# Patient Record
Sex: MALE | Race: WHITE | Employment: OTHER | URBAN - NONMETROPOLITAN AREA
[De-identification: names, ages, dates, MRNs, and addresses within clinical notes are randomized per-mention and may not be internally consistent; named-entity substitution may affect disease eponyms.]

---

## 2017-06-26 ENCOUNTER — AMBULATORY - GICH (OUTPATIENT)
Dept: RADIOLOGY | Facility: OTHER | Age: 67
End: 2017-06-26

## 2017-06-26 DIAGNOSIS — R01.1 CARDIAC MURMUR: ICD-10-CM

## 2017-07-10 ENCOUNTER — MEDICAL CORRESPONDENCE (OUTPATIENT)
Facility: CLINIC | Age: 67
End: 2017-07-10
Payer: COMMERCIAL

## 2017-07-10 ENCOUNTER — HOSPITAL ENCOUNTER (OUTPATIENT)
Dept: RADIOLOGY | Facility: OTHER | Age: 67
End: 2017-07-10

## 2017-07-10 ENCOUNTER — TRANSFERRED RECORDS (OUTPATIENT)
Dept: HEALTH INFORMATION MANAGEMENT | Facility: CLINIC | Age: 67
End: 2017-07-10

## 2017-07-10 DIAGNOSIS — R01.1 CARDIAC MURMUR: ICD-10-CM

## 2017-07-10 PROCEDURE — 93306 TTE W/DOPPLER COMPLETE: CPT | Mod: 26 | Performed by: INTERNAL MEDICINE

## 2018-01-31 ENCOUNTER — DOCUMENTATION ONLY (OUTPATIENT)
Dept: FAMILY MEDICINE | Facility: OTHER | Age: 68
End: 2018-01-31

## 2018-05-22 ENCOUNTER — TRANSFERRED RECORDS (OUTPATIENT)
Dept: HEALTH INFORMATION MANAGEMENT | Facility: OTHER | Age: 68
End: 2018-05-22

## 2018-07-03 ENCOUNTER — TRANSFERRED RECORDS (OUTPATIENT)
Dept: HEALTH INFORMATION MANAGEMENT | Facility: OTHER | Age: 68
End: 2018-07-03

## 2018-07-13 ENCOUNTER — OFFICE VISIT (OUTPATIENT)
Dept: CARDIOLOGY | Facility: OTHER | Age: 68
End: 2018-07-13
Attending: INTERNAL MEDICINE
Payer: COMMERCIAL

## 2018-07-13 VITALS
HEIGHT: 71 IN | BODY MASS INDEX: 26.98 KG/M2 | DIASTOLIC BLOOD PRESSURE: 76 MMHG | WEIGHT: 192.7 LBS | HEART RATE: 60 BPM | SYSTOLIC BLOOD PRESSURE: 102 MMHG

## 2018-07-13 DIAGNOSIS — I10 ESSENTIAL HYPERTENSION: Primary | ICD-10-CM

## 2018-07-13 DIAGNOSIS — R00.2 PALPITATIONS: ICD-10-CM

## 2018-07-13 DIAGNOSIS — N52.9 ERECTILE DYSFUNCTION, UNSPECIFIED ERECTILE DYSFUNCTION TYPE: ICD-10-CM

## 2018-07-13 DIAGNOSIS — Z90.5 H/O UNILATERAL NEPHRECTOMY: ICD-10-CM

## 2018-07-13 DIAGNOSIS — R06.02 SHORTNESS OF BREATH: ICD-10-CM

## 2018-07-13 DIAGNOSIS — I38 VALVULAR REGURGITATION: ICD-10-CM

## 2018-07-13 PROCEDURE — G0463 HOSPITAL OUTPT CLINIC VISIT: HCPCS | Mod: 25

## 2018-07-13 PROCEDURE — 99204 OFFICE O/P NEW MOD 45 MIN: CPT | Performed by: INTERNAL MEDICINE

## 2018-07-13 PROCEDURE — 93005 ELECTROCARDIOGRAM TRACING: CPT | Performed by: INTERNAL MEDICINE

## 2018-07-13 ASSESSMENT — PAIN SCALES - GENERAL: PAINLEVEL: NO PAIN (0)

## 2018-07-13 NOTE — PROGRESS NOTES
[facility] HEART CARE   CARDIOLOGY CONSULT     Garcia Isbell   1950  [id number]    Ming Johnson     Chief Complaint   Patient presents with     Consult     Abnormal Echo          HPI:   Mr. Mandel is 67-year-old gentleman who is being seen for trace aortic regurgitation and mild tricuspid regurgitation.  Also, he has concerned with dyspnea with significant activity as well as erectile dysfunction.    He sees Dr. Johnson in [place], [place].  It sounds like he was in there for routine visit.  His echo was reviewed.  He was found to have trace aortic insufficiency and mild tricuspid regurgitation.  He wanted a second opinion more or less for reassurance that nothing bad is going to come about.  His ejection fraction of 60-65%.  He had no other significant findings.  His echo was completed on 10/7/17.    He also has concerns with ED.  He states that he has tried all 3 medications for ED but he is unable to maintain erection.  It seems that his problem with ED is more of a mindset than a vascular problem.  It was explained to him that this is not a common thing to be treated by cardiology.  He was hoping to be seen by urology and a referral was placed.    He also has concerns for dyspnea.  He is rather active.  He says he walks on a regular basis.  When they were self, he would ride his bike.  He went to [place] with his son and they were able to ride there bikes on paved trails.  He describes them as being hilly.  There were older women there who were able to do this without problems.  He thinks he should be able to do them as well.  He was concerned it was heart related.  He has been able to lose 20 pounds with his activity.     He is also concerned with fatigue.  He goes to bed at approximately 9 or 10:00 at night and sleeps till 5 AM.  He said 6 AM is sleeping in for him.  It does not sound like this is cardiac related.    IMAGING RESULTS:       ALLERGIES:   No Known Allergies     PAST MEDICAL HISTORY:   No past  medical history on file.     PAST SURGICAL HISTORY:   No past surgical history on file.     FAMILY HISTORY:   No family history on file.     SOCIAL HISTORY:   Social History     Social History     Marital status:      Spouse name: N/A     Number of children: N/A     Years of education: N/A     Social History Main Topics     Smoking status: Never Smoker     Smokeless tobacco: Never Used     Alcohol use None     Drug use: None     Sexual activity: Not Asked     Other Topics Concern     None     Social History Narrative    p 9/12/2013.         CURRENT MEDICATIONS:   Prior to Admission medications    Not on File          ROS:   CONSTITUTIONAL: No weight loss, fever, chills, but he does feel weak and have fatigue.   HEENT: Eyes: No visual changes. Ears, Nose, Throat: No hearing loss, congestion or difficulty swallowing.   CARDIOVASCULAR: No chest pain, chest pressure or chest discomfort. No palpitations or lower extremity edema.   RESPIRATORY: (+) shortness of breath with dyspnea upon exertion, but no cough or sputum production.   GASTROINTESTINAL: No abdominal pain. No anorexia, nausea, vomiting or diarrhea.   NEUROLOGICAL: No headache, lightheadedness, dizziness, syncope, ataxia or weakness.   HEMATOLOGIC: No anemia, bleeding or bruising.   PSYCHIATRIC: No history of depression or anxiety.   ENDOCRINOLOGIC: No reports of sweating, cold or heat intolerance. No polyuria or polydipsia.   SKIN: No abnormal rashes or itching.       PHYSICAL EXAM:   GENERAL: The patient is a well-developed, well-nourished, in no apparent distress. Alert and oriented x3.   HEENT: Head is normocephalic and atraumatic. Eyes are symmetrical with normal visual tracking.  HEART: Regular rate and rhythm, S1S2 present without murmur, rub or gallop.   LUNGS: Respirations regular and unlabored. Clear to auscultation.   GI: Abdomen is soft and non distended.   EXTREMITIES: No peripheral edema present.   MUSCULOSKELETAL: No joint swelling.    NEUROLOGIC: Alert and oriented X3.    SKIN: No jaundice. No rashes or visible skin lesions present.       EKG:    Shows normal sinus rhythm.    LAB RESULTS:   No visits with results within 2 Month(s) from this visit.  Latest known visit with results is:    Ambulatory - [facility] on 09/17/2012   Component Date Value Ref Range Status     TSH - Historical 09/17/2012 1.28  0.35 - 4.94 uIU/mL Final     T4 (Thyroxine) - Historical 09/17/2012 7.6  4.9 - 11.7 ug/dL Final     Glucose 09/17/2012 98  65 - 100 mg/dL Final     Urea Nitrogen 09/17/2012 23  8 - 25 mg/dL Final     A/G Ratio - Historical 09/17/2012 1.4  1.0 - 2.0 Final     Potassium 09/17/2012 4.8  3.5 - 5.0 mmol/L Final     BUN/Creatinine Ratio - Historical 09/17/2012 19  10 - 20 Final     Protein Total 09/17/2012 8.0  6.0 - 8.0 g/dL Final     Alkaline Phosphatase - Historical 09/17/2012 61  50 - 136 U/L Final     AST (GOT) - Historical 09/17/2012 27  2 - 40 U/L Final     GFR If Not  - Hist* 09/17/2012 >60  >60 ml/min/1.73m2 Final     Globulin - Historical 09/17/2012 3.3  2.0 - 3.7 g/dL Final     Bilirubin, Total - Historical 09/17/2012 1.0  0.2 - 1.2 mg/dL Final     Sodium 09/17/2012 140  135 - 145 mmol/L Final     Carbon Dioxide 09/17/2012 26  23 - 31 mmol/L Final     Albumin 09/17/2012 4.7  3.8 - 5.0 g/dL Final     Chloride 09/17/2012 106  98 - 107 mmol/L Final     ALT (SGPT) - Historical 09/17/2012 46* 8 - 45 U/L Final     Anion Gap - Historical 09/17/2012 8  5 - 18 Final     Calcium 09/17/2012 9.1  8.5 - 10.5 mg/dL Final     Creatinine 09/17/2012 1.19  0.72 - 1.25 mg/dL Final     GFR Estimate If Black 09/17/2012 >60  >60 ml/min/1.73m2 Final     HDL Cholesterol 09/17/2012 43  >40 mg/dL Final     Cholesterol/HDL Ratio - Historical 09/17/2012 3.44  <4.50 Final     LDL Cholesterol Calculated 09/17/2012 89  <=130 mg/dL Final     Patient Status - Historical 09/17/2012 FASTING   Final     Triglycerides 09/17/2012 78  <150 mg/dL Final     Cholesterol  Total 09/17/2012 148  100 - 199 mg/dL Final     PSA Total (Diagnostic) - Historical 09/17/2012 1.49  <4.00 ng/mL Final     Hemoglobin 09/17/2012 15.2  13.5 - 17.5 g/dL Final     MCHC 09/17/2012 35.3  32.0 - 36.0 g/dL Final     Absolute Monocytes - Historical 09/17/2012 0.6  <0.9 thou/cu mm Final     White Blood Count - Historical 09/17/2012 9.5  4.5 - 11.0 thou/cu mm Final     % Neutrophils 09/17/2012 60.9  42.0 - 72.0 % Final     MCH 09/17/2012 31.9  26.0 - 34.0 pg Final     RDW 09/17/2012 11.1* 11.5 - 15.5 % Final     Absolute Neutrophils - Historical 09/17/2012 5.8  1.7 - 7.0 thou/cu mm Final     BASO 09/17/2012 1.6  <3.0 % Final     Hematocrit 09/17/2012 42.9  37.0 - 53.0 % Final     Platelet Count 09/17/2012 279  140 - 440 thou/cu mm Final     % Monocytes 09/17/2012 6.1  <12.0 % Final     % Eosinophils 09/17/2012 1.0  <8.0 % Final     Absolute Lymphocytes - Historical 09/17/2012 2.9  0.9 - 2.9 thou/cu mm Final     Absolute Eosinophils - Historical 09/17/2012 0.1  <0.5 thou/cu mm Final     MPV 09/17/2012 8.4  6.5 - 11.0 fL Final     Red Blood Count - Historical 09/17/2012 4.75  4.30 - 5.90 mil/cu mm Final     MCV 09/17/2012 90  80 - 100 fL Final     % Lymphocytes 09/17/2012 30.4  20.0 - 44.0 % Final     Absolute Basophils - Historical 09/17/2012 0.1  <0.3 thou/cu mm Final            ASSESSMENT:       ICD-10-CM    1. Essential hypertension I10 EKG 12-lead, tracing only (Same Day)   2. Erectile dysfunction, unspecified erectile dysfunction type N52.9 UROLOGY ADULT REFERRAL   3. Palpitations R00.2    4. Shortness of breath R06.02    5. Valvular regurgitation I38          PLAN:   1.  We reviewed his echo.  He was reassured.  These valvular abnormalities are not concerning and do not need to be followed.  2.  As far as erectile dysfunction, he was referred to urology.  It was explained to him that ED can be a mindset rather than a vascular problem.  He wanted to see urology anyway.  3.  He was reassured about his  abilities with activity.  He is not describing chest pain or chest discomfort.  It seems that it takes considerable exertion to make him short of breath.  He rides his bike at least 7 miles.  When he is on flat ground, he has no problems.  It was suggested he focus on his activity, increasing it slowly as previously instructed.  4.  As far as fatigue, it was recommended to focus on sleep hygiene.  5.  He will be seen in the future as needed basis as he does not have any pressing cardiac concerns.      Thank you for allowing me to participate in the care of your patient. Please do not hesitate to contact me if you have any questions.     Bharat Rosales, DO

## 2018-07-13 NOTE — MR AVS SNAPSHOT
After Visit Summary   7/13/2018    Garcia Isbell    MRN: [id number]           Patient Information     Date Of Birth          1950        Visit Information        Provider Department      7/13/2018 1:15 PM Bharat Rosales, DO [facility]        Today's Diagnoses     Essential hypertension    -  1    Erectile dysfunction, unspecified erectile dysfunction type        Palpitations        Shortness of breath        Valvular regurgitation        H/O unilateral nephrectomy           Follow-ups after your visit        Additional Services     UROLOGY ADULT REFERRAL       Your provider has referred you to: [facility] for ED.  Please be aware that coverage of these services is subject to the terms and limitations of your health insurance plan.  Call member services at your health plan with any benefit or coverage questions.      Please bring the following with you to your appointment:    (1) Any X-Rays, CTs or MRIs which have been performed.  Contact the facility where they were done to arrange for  prior to your scheduled appointment.    (2) List of current medications  (3) This referral request   (4) Any documents/labs given to you for this referral                  Follow-up notes from your care team     Return if symptoms worsen or fail to improve.      Who to contact     If you have questions or need follow up information about today's clinic visit or your schedule please contact [facility] AND [facility] directly at [phone].  Normal or non-critical lab and imaging results will be communicated to you by MyChart, letter or phone within 4 business days after the clinic has received the results. If you do not hear from us within 7 days, please contact the clinic through [employer] or phone. If you have a critical or abnormal lab result, we will notify you by phone as soon as possible.  Submit refill requests through [employer] or call your pharmacy and they will forward the  "refill request to us. Please allow 3 business days for your refill to be completed.          Additional Information About Your Visit        MyChart Information     [employer] lets you send messages to your doctor, view your test results, renew your prescriptions, schedule appointments and more. To sign up, go to [email or web address] . Click on \"Log in\" on the left side of the screen, which will take you to the Welcome page. Then click on \"Sign up Now\" on the right side of the page.     You will be asked to enter the access code listed below, as well as some personal information. Please follow the directions to create your username and password.     Your access code is: [email or web address]  Expires: 10/11/2018  2:32 PM     Your access code will  in 90 days. If you need help or a new code, please call your [place] clinic or [phone].        Care EveryWhere ID     This is your Care EveryWhere ID. This could be used by other organizations to access your [place] medical records  [id number]        Your Vitals Were     Pulse Height BMI (Body Mass Index)             60 1.803 m (5' 11\") 26.88 kg/m2          Blood Pressure from Last 3 Encounters:   18 102/76    Weight from Last 3 Encounters:   18 87.4 kg (192 lb 11.2 oz)              We Performed the Following     EKG 12-lead, tracing only (Same Day)     UROLOGY ADULT REFERRAL        Primary Care Provider Office Phone # Fax #    Ming Johnson [phone]       DAISHA   [facility] [address]        Equal Access to Services     [facility]: Hadii brian hicks hadasho Sobella, waaxda luqadaha, qaybta kaalmada adrien, leonard parisi . So [facility] [phone].    ATENCIÓN: Si habla español, tiene a yancey disposición servicios gratuitos de asistencia lingüística. Llame al [phone].    We comply with applicable federal civil rights laws and [place] laws. We do not discriminate on the basis of race, color, " national origin, age, disability, sex, sexual orientation, or gender identity.            Thank you!     Thank you for choosing [facility] AND [facility]  for your care. Our goal is always to provide you with excellent care. Hearing back from our patients is one way we can continue to improve our services. Please take a few minutes to complete the written survey that you may receive in the mail after your visit with us. Thank you!             Your Updated Medication List - Protect others around you: Learn how to safely use, store and throw away your medicines at www.disposemymeds.org.      Notice  As of 7/13/2018  2:32 PM    You have not been prescribed any medications.

## 2018-07-13 NOTE — NURSING NOTE
Pt presents today for consultation r/t Echo 07/2017. Pt PCP Dr. Johnson at [facility] in [place]. C/o fatigue and episodes of lightheadedness on position change. Denies SOB, chest pain/pressure/heaviness. Vashti Castro RN on 7/13/2018 at 1:25 PM

## 2018-07-26 ENCOUNTER — OFFICE VISIT (OUTPATIENT)
Dept: UROLOGY | Facility: OTHER | Age: 68
End: 2018-07-26
Attending: UROLOGY
Payer: COMMERCIAL

## 2018-07-26 VITALS — RESPIRATION RATE: 14 BRPM | HEART RATE: 60 BPM | WEIGHT: 192 LBS | BODY MASS INDEX: 26.78 KG/M2

## 2018-07-26 DIAGNOSIS — N52.9 ERECTILE DYSFUNCTION, UNSPECIFIED ERECTILE DYSFUNCTION TYPE: Primary | ICD-10-CM

## 2018-07-26 PROCEDURE — 99203 OFFICE O/P NEW LOW 30 MIN: CPT | Performed by: UROLOGY

## 2018-07-26 PROCEDURE — G0463 HOSPITAL OUTPT CLINIC VISIT: HCPCS

## 2018-07-26 RX ORDER — HYDROCHLOROTHIAZIDE 12.5 MG/1
CAPSULE ORAL
Refills: 0 | COMMUNITY
Start: 2018-05-31 | End: 2018-12-05

## 2018-07-26 RX ORDER — SILDENAFIL 100 MG/1
TABLET, FILM COATED ORAL
Qty: 40 TABLET | Refills: 11 | Status: SHIPPED | OUTPATIENT
Start: 2018-07-26

## 2018-07-26 ASSESSMENT — PAIN SCALES - GENERAL: PAINLEVEL: NO PAIN (0)

## 2018-07-26 NOTE — PROGRESS NOTES
I was asked to see this patient by Dr Rosales and provide my opinion about the following:  Erectile dysfunction    Type of Visit  Consult    Chief Complaint  Erectile dysfunction    HPI  Mr. Isbell is a 67 year old male who presents with erectile dysfunction.  His ED issues started about 10 years ago.  He has tried Viagra, Cialis and Levitra in the past.  Currently he is using sildenafil 100mg per dose.  He rates his erectile rigidity as 8/10 with treatment.  Primary issue is maintenance of erection.  He reports taking the previous PDE5 inhibitors correctly  He does not take oral nitrates for chest pain.  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      Past Medical History  Patient Active Problem List   Diagnosis     Essential hypertension     Erectile dysfunction, unspecified erectile dysfunction type     Palpitations     Shortness of breath     Valvular regurgitation     H/O unilateral nephrectomy     Past Surgical History  He  has no past surgical history on file.    Medications  He has a current medication list which includes the following prescription(s): hydrochlorothiazide.    Allergies  No Known Allergies    Social History  He  reports that he has never smoked. He has never used smokeless tobacco.  No drug abuse.    Family History  History reviewed. No pertinent family history.    Review of Systems  I personally reviewed the ROS with the patient.    Nursing Notes:   Keisha Willson LPN  7/26/2018  1:18 PM  Signed  Pt presents to clinic for discussion of ED problems    Review of Systems:    Weight loss:    Yes     Recent fever/chills:  No   Night sweats:   No  Current skin rash:  No   Recent hair loss:  No  Heat intolerance:  No   Cold intolerance:  No  Chest pain:   No   Palpitations:   No  Shortness of breath:  No   Wheezing:   No  Constipation:    No   Diarrhea:   No   Nausea:   No   Vomiting:   No   Kidney/side pain:  No   Back pain:   Yes  Frequent headaches:  No   Dizziness:     No  Leg  swelling:   No   Calf pain:    No    Parents, brothers or sisters with history of kidney cancer:   No  Parents, brothers or sisters with history of bladder cancer: No  Father or brother with history of prostate cancer:  No      Physical Exam  Vitals:    07/26/18 1310   Pulse: 60   Resp: 14   Weight: 87.1 kg (192 lb)     Constitutional: No acute distress.  Alert and cooperative   Head: NCAT  Eyes: Conjunctivae normal  Cardiovascular: Regular rate  Pulmonary/Chest: Respirations are even and non-labored bilaterally, no audible wheezing  Abdominal: Soft. No distension, tenderness, masses or guarding.   Neurological: A + O x 3.  Cranial Nerves II-XII grossly intact.  Extremities: SHELLIE x 4, Warm. No clubbing.  No cyanosis.    Skin: Pink, warm and dry.  No visible rashes noted.  Psychiatric:  Normal mood and affect  Back:  No left CVA tenderness.  No right CVA tenderness.  Genitourinary:  Nonpalpable bladder  Normal male phallus without discharge or lesions, normal pubic hair distribution.    Testicles descended bilaterally.     Labs  Results for TIFFANY WHITE (MRN [id number]) as of 7/26/2018 13:18   9/17/2012 11:20   PSA Total (Diagnostic) - Historical 1.49     Results for TIFFANY WHITE (MRN [id number]) as of 7/26/2018 13:18   9/17/2012 11:20   TSH - Historical 1.28     Results for TIFFANY WHITE (MRN [id number]) as of 7/26/2018 13:18   9/17/2012 11:02   Sodium 140   Potassium 4.8   Chloride 106   Carbon Dioxide 26   Urea Nitrogen 23   Creatinine 1.19   BUN/Creatinine Ratio - Historical 19   GFR Estimate If Black >60   GFR If Not  - Historical >60   Calcium 9.1   Anion Gap - Historical 8   A/G Ratio - Historical 1.4   Albumin 4.7   Protein Total 8.0   Alkaline Phosphatase - Historical 61   ALT (SGPT) - Historical 46 (H)   AST (GOT) - Historical 27     Assessment  Mr. White is a 67 year old male with erectile dysfunction.  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    We discussed treatment  options including oral PDE5- medication, Trimix injections, vacuum erection devices and implantable penile prostheses.    He was informed of the most common side effects of Viagra such as headache, flushing, nausea and visual changes (such as blurred vision).    Plan  Continue sildenafil 60-100mg by mouth 1 hour prior to sexual activity (empty stomach).   - To improve maintenance of erection I recommended a constriction band.  Follow up if ED progresses and requires additional therapy..

## 2018-07-26 NOTE — MR AVS SNAPSHOT
"              After Visit Summary   2018    Garcia Isbell    MRN: [id number]           Patient Information     Date Of Birth          1950        Visit Information        Provider Department      2018 1:00 PM Gene Hightower MD [facility]        Today's Diagnoses     Erectile dysfunction, unspecified erectile dysfunction type    -  1       Follow-ups after your visit        Who to contact     If you have questions or need follow up information about today's clinic visit or your schedule please contact [facility] directly at [phone].  Normal or non-critical lab and imaging results will be communicated to you by [email or web address], letter or phone within 4 business days after the clinic has received the results. If you do not hear from us within 7 days, please contact the clinic through [employer] or phone. If you have a critical or abnormal lab result, we will notify you by phone as soon as possible.  Submit refill requests through [employer] or call your pharmacy and they will forward the refill request to us. Please allow 3 business days for your refill to be completed.          Additional Information About Your Visit        MyChart Information     [employer] lets you send messages to your doctor, view your test results, renew your prescriptions, schedule appointments and more. To sign up, go to [email or web address] . Click on \"Log in\" on the left side of the screen, which will take you to the Welcome page. Then click on \"Sign up Now\" on the right side of the page.     You will be asked to enter the access code listed below, as well as some personal information. Please follow the directions to create your username and password.     Your access code is: [email or web address]  Expires: 10/11/2018  2:32 PM     Your access code will  in 90 days. If you need help or a new code, please call your [place] clinic or [phone].        Care EveryWhere ID     This is your Care " EveryWhere ID. This could be used by other organizations to access your [place] medical records  [id number]        Your Vitals Were     Pulse Respirations BMI (Body Mass Index)             60 14 26.78 kg/m2          Blood Pressure from Last 3 Encounters:   07/13/18 102/76    Weight from Last 3 Encounters:   07/26/18 87.1 kg (192 lb)   07/13/18 87.4 kg (192 lb 11.2 oz)              Today, you had the following     No orders found for display         Today's Medication Changes          These changes are accurate as of 7/26/18 11:59 PM.  If you have any questions, ask your nurse or doctor.               Start taking these medicines.        Dose/Directions    sildenafil 100 MG tablet   Commonly known as:  VIAGRA   Used for:  Erectile dysfunction, unspecified erectile dysfunction type   Started by:  Gene Hightower MD        Take 1 tablet 1 hour prior to sexual activity as instructed.   Quantity:  40 tablet   Refills:  11            Where to get your medicines      Some of these will need a paper prescription and others can be bought over the counter.  Ask your nurse if you have questions.     Bring a paper prescription for each of these medications     sildenafil 100 MG tablet                Primary Care Provider Office Phone # Fax #    Ming Johnson [phone]       [facility]   [facility] [phone]        Equal Access to Services     ROSALBA FARLEY AH: Massimo tovaro Sodemarcusali, waaxda luqadaha, qaybta kaalmada adeegyada, leonard maki. So [facility] [phone].    ATENCIÓN: Si habla español, tiene a yancey disposición servicios gratuitos de asistencia lingüística. Llame al [phone].    We comply with applicable federal civil rights laws and [place] laws. We do not discriminate on the basis of race, color, national origin, age, disability, sex, sexual orientation, or gender identity.            Thank you!     Thank you for choosing [facility] AND  [facility]  for your care. Our goal is always to provide you with excellent care. Hearing back from our patients is one way we can continue to improve our services. Please take a few minutes to complete the written survey that you may receive in the mail after your visit with us. Thank you!             Your Updated Medication List - Protect others around you: Learn how to safely use, store and throw away your medicines at www.disposemymeds.org.          This list is accurate as of 7/26/18 11:59 PM.  Always use your most recent med list.                   Brand Name Dispense Instructions for use Diagnosis    hydrochlorothiazide 12.5 MG capsule    MICROZIDE          sildenafil 100 MG tablet    VIAGRA    40 tablet    Take 1 tablet 1 hour prior to sexual activity as instructed.    Erectile dysfunction, unspecified erectile dysfunction type

## 2018-07-26 NOTE — NURSING NOTE
Pt presents to clinic for discussion of ED problems    Review of Systems:    Weight loss:    Yes     Recent fever/chills:  No   Night sweats:   No  Current skin rash:  No   Recent hair loss:  No  Heat intolerance:  No   Cold intolerance:  No  Chest pain:   No   Palpitations:   No  Shortness of breath:  No   Wheezing:   No  Constipation:    No   Diarrhea:   No   Nausea:   No   Vomiting:   No   Kidney/side pain:  No   Back pain:   Yes  Frequent headaches:  No   Dizziness:     No  Leg swelling:   No   Calf pain:    No    Parents, brothers or sisters with history of kidney cancer:   No  Parents, brothers or sisters with history of bladder cancer: No  Father or brother with history of prostate cancer:  No

## 2018-10-22 ENCOUNTER — ALLIED HEALTH/NURSE VISIT (OUTPATIENT)
Dept: FAMILY MEDICINE | Facility: OTHER | Age: 68
End: 2018-10-22
Payer: COMMERCIAL

## 2018-10-22 DIAGNOSIS — Z23 NEED FOR VACCINATION: Primary | ICD-10-CM

## 2018-10-22 DIAGNOSIS — Z23 NEED FOR PROPHYLACTIC VACCINATION AND INOCULATION AGAINST INFLUENZA: ICD-10-CM

## 2018-10-22 PROCEDURE — G0009 ADMIN PNEUMOCOCCAL VACCINE: HCPCS

## 2018-10-22 PROCEDURE — 90662 IIV NO PRSV INCREASED AG IM: CPT

## 2018-10-22 PROCEDURE — 90471 IMMUNIZATION ADMIN: CPT

## 2018-10-22 PROCEDURE — 90670 PCV13 VACCINE IM: CPT

## 2018-10-22 PROCEDURE — G0008 ADMIN INFLUENZA VIRUS VAC: HCPCS

## 2018-10-22 NOTE — MR AVS SNAPSHOT
After Visit Summary   10/22/2018    Garcia Isbell    MRN: [id number]           Patient Information     Date Of Birth          1950        Visit Information        Provider Department      10/22/2018 1:45 PM  INJECTION NURSE [facility]        Today's Diagnoses     Need for vaccination    -  1    Need for prophylactic vaccination and inoculation against influenza           Follow-ups after your visit        Your next 10 appointments already scheduled     Dec 19, 2018 10:15 AM CST   Nurse Only with  INJECTION NURSE   [facility]    [address]  [place] [id number] [phone]              Who to contact     If you have questions or need follow up information about today's clinic visit or your schedule please contact [facility] directly at [phone].  Normal or non-critical lab and imaging results will be communicated to you by MyChart, letter or phone within 4 business days after the clinic has received the results. If you do not hear from us within 7 days, please contact the clinic through MyChart or phone. If you have a critical or abnormal lab result, we will notify you by phone as soon as possible.  Submit refill requests through [employer] or call your pharmacy and they will forward the refill request to us. Please allow 3 business days for your refill to be completed.          Additional Information About Your Visit        Care EveryWhere ID     This is your Care EveryWhere ID. This could be used by other organizations to access your [facility] medical records  [id number]         Blood Pressure from Last 3 Encounters:   07/13/18 102/76    Weight from Last 3 Encounters:   07/26/18 192 lb (87.1 kg)   07/13/18 192 lb 11.2 oz (87.4 kg)              We Performed the Following     HC FLU VACCINE, INCREASED ANTIGEN, PRESV FREE     Pneumococcal vaccine 13 valent PCV13 IM (Prevnar)  [49252]     Vaccine Administration, Initial [[id number]]        Primary Care Provider Office Phone # Fax #    Ming Elizabeth [phone]       ADISHA   [facility] [address]        Equal Access to Services     ROSALBA FARLEY : Hadtwila brian hicks guyo Soomaali, waaxda luqadaha, qaybta kaalmada adeegyada, leonard keyesn antwan everett lagitadoron maki. So [facility] [phone].    ATENCIÓN: Si habla español, tiene a yancey disposición servicios gratuitos de asistencia lingüística. Llame al [phone].    We comply with applicable federal civil rights laws and [place] laws. We do not discriminate on the basis of race, color, national origin, age, disability, sex, sexual orientation, or gender identity.            Thank you!     Thank you for choosing [facility] [place] AND [facility]  for your care. Our goal is always to provide you with excellent care. Hearing back from our patients is one way we can continue to improve our services. Please take a few minutes to complete the written survey that you may receive in the mail after your visit with us. Thank you!             Your Updated Medication List - Protect others around you: Learn how to safely use, store and throw away your medicines at www.disposemymeds.org.          This list is accurate as of 10/22/18  1:57 PM.  Always use your most recent med list.                   Brand Name Dispense Instructions for use Diagnosis    hydrochlorothiazide 12.5 MG capsule    MICROZIDE          sildenafil 100 MG tablet    VIAGRA    40 tablet    Take 1 tablet 1 hour prior to sexual activity as instructed.    Erectile dysfunction, unspecified erectile dysfunction type

## 2018-10-22 NOTE — PROGRESS NOTES
Pt denies allergies to yeast gelatin neosporin eggs thimerasol or latex or past reactions to vaccinations. Copy of MIIC given.      Injectable Influenza Immunization Documentation    1.  Is the person to be vaccinated sick today?   No    2. Does the person to be vaccinated have an allergy to a component   of the vaccine?   No  Egg Allergy Algorithm Link    3. Has the person to be vaccinated ever had a serious reaction   to influenza vaccine in the past?   No    4. Has the person to be vaccinated ever had Guillain-Barré syndrome?   No    Form completed by John BEY

## 2018-12-05 ENCOUNTER — OFFICE VISIT (OUTPATIENT)
Dept: FAMILY MEDICINE | Facility: OTHER | Age: 68
End: 2018-12-05
Attending: FAMILY MEDICINE
Payer: COMMERCIAL

## 2018-12-05 VITALS
DIASTOLIC BLOOD PRESSURE: 78 MMHG | WEIGHT: 191.4 LBS | HEART RATE: 64 BPM | BODY MASS INDEX: 26.69 KG/M2 | SYSTOLIC BLOOD PRESSURE: 124 MMHG

## 2018-12-05 DIAGNOSIS — R10.84 ABDOMINAL PAIN, GENERALIZED: ICD-10-CM

## 2018-12-05 LAB
ALBUMIN SERPL-MCNC: 4.4 G/DL (ref 3.5–5.7)
ALP SERPL-CCNC: 55 U/L (ref 34–104)
ALT SERPL W P-5'-P-CCNC: 20 U/L (ref 7–52)
ANION GAP SERPL CALCULATED.3IONS-SCNC: 7 MMOL/L (ref 3–14)
AST SERPL W P-5'-P-CCNC: 17 U/L (ref 13–39)
BILIRUB SERPL-MCNC: 0.7 MG/DL (ref 0.3–1)
BUN SERPL-MCNC: 19 MG/DL (ref 7–25)
CALCIUM SERPL-MCNC: 9.3 MG/DL (ref 8.6–10.3)
CHLORIDE SERPL-SCNC: 104 MMOL/L (ref 98–107)
CO2 SERPL-SCNC: 29 MMOL/L (ref 21–31)
CREAT SERPL-MCNC: 1.09 MG/DL (ref 0.7–1.3)
ERYTHROCYTE [DISTWIDTH] IN BLOOD BY AUTOMATED COUNT: 12.2 % (ref 10–15)
GFR SERPL CREATININE-BSD FRML MDRD: NORMAL ML/MIN/1.7M2
GLUCOSE SERPL-MCNC: 91 MG/DL (ref 70–105)
HCT VFR BLD AUTO: 43.4 % (ref 40–53)
HGB BLD-MCNC: 15.1 G/DL (ref 13.3–17.7)
LIPASE SERPL-CCNC: 33 U/L (ref 11–82)
MCH RBC QN AUTO: 33.2 PG (ref 26.5–33)
MCHC RBC AUTO-ENTMCNC: 34.8 G/DL (ref 31.5–36.5)
MCV RBC AUTO: 95 FL (ref 78–100)
PLATELET # BLD AUTO: 252 10E9/L (ref 150–450)
POTASSIUM SERPL-SCNC: 4 MMOL/L (ref 3.5–5.1)
PROT SERPL-MCNC: 6.8 G/DL (ref 6.4–8.9)
RBC # BLD AUTO: 4.55 10E12/L (ref 4.4–5.9)
SODIUM SERPL-SCNC: 140 MMOL/L (ref 134–144)
WBC # BLD AUTO: 9.6 10E9/L (ref 4–11)

## 2018-12-05 PROCEDURE — G0463 HOSPITAL OUTPT CLINIC VISIT: HCPCS

## 2018-12-05 PROCEDURE — 36415 COLL VENOUS BLD VENIPUNCTURE: CPT | Performed by: FAMILY MEDICINE

## 2018-12-05 PROCEDURE — 85027 COMPLETE CBC AUTOMATED: CPT | Performed by: FAMILY MEDICINE

## 2018-12-05 PROCEDURE — 83690 ASSAY OF LIPASE: CPT | Performed by: FAMILY MEDICINE

## 2018-12-05 PROCEDURE — 80053 COMPREHEN METABOLIC PANEL: CPT | Performed by: FAMILY MEDICINE

## 2018-12-05 PROCEDURE — 99214 OFFICE O/P EST MOD 30 MIN: CPT | Performed by: FAMILY MEDICINE

## 2018-12-05 ASSESSMENT — PAIN SCALES - GENERAL: PAINLEVEL: NO PAIN (0)

## 2018-12-05 NOTE — MR AVS SNAPSHOT
After Visit Summary   12/5/2018    Garcia Isbell    MRN: [id number]           Patient Information     Date Of Birth          1950        Visit Information        Provider Department      12/5/2018 1:45 PM Ming Randall MD [facility]        Today's Diagnoses     Abdominal pain, generalized           Follow-ups after your visit        Your next 10 appointments already scheduled     Dec 19, 2018 10:15 AM CST   Nurse Only with [facility] INJECTION NURSE   [facility]    [address]  [place] [address] [phone]              Who to contact     If you have questions or need follow up information about today's clinic visit or your schedule please contact [facility] directly at [phone].  Normal or non-critical lab and imaging results will be communicated to you by MyChart, letter or phone within 4 business days after the clinic has received the results. If you do not hear from us within 7 days, please contact the clinic through MyChart or phone. If you have a critical or abnormal lab result, we will notify you by phone as soon as possible.  Submit refill requests through [employer] or call your pharmacy and they will forward the refill request to us. Please allow 3 business days for your refill to be completed.          Additional Information About Your Visit        Care EveryWhere ID     This is your Care EveryWhere ID. This could be used by other organizations to access your [facility] medical records  [id number]        Your Vitals Were     Pulse BMI (Body Mass Index)                64 26.69 kg/m2           Blood Pressure from Last 3 Encounters:   12/05/18 124/78   07/13/18 102/76    Weight from Last 3 Encounters:   12/05/18 191 lb 6.4 oz (86.8 kg)   07/26/18 192 lb (87.1 kg)   07/13/18 192 lb 11.2 oz (87.4 kg)              We Performed the Following     CBC W PLT No Diff      Comprehensive Metabolic Panel     Lipase          Today's Medication Changes          These changes are accurate as of 12/5/18 11:59 PM.  If you have any questions, ask your nurse or doctor.               Stop taking these medicines if you haven't already. Please contact your care team if you have questions.     hydrochlorothiazide 12.5 MG capsule   Commonly known as:  MICROZIDE   Stopped by:  Ming Randall MD                    Primary Care Provider Office Phone # Fax #    Ming Johnson [phone]       DAISHA   [facility] [address]        Equal Access to Services     [facility]: Hadii aad ku hadasho Soomaali, waaxda luqadaha, qaybta kaalmada adeegyada, leonard parisi . So [facility] [place] [phone].    ATENCIÓN: Si habla español, tiene a yancey disposición servicios gratuitos de asistencia lingüística. [facility] [phone].    We comply with applicable federal civil rights laws and [place] laws. We do not discriminate on the basis of race, color, national origin, age, disability, sex, sexual orientation, or gender identity.            Thank you!     Thank you for choosing [facility] [place] [facility] AND [facility]  for your care. Our goal is always to provide you with excellent care. Hearing back from our patients is one way we can continue to improve our services. Please take a few minutes to complete the written survey that you may receive in the mail after your visit with us. Thank you!             Your Updated Medication List - Protect others around you: Learn how to safely use, store and throw away your medicines at www.disposemymeds.org.          This list is accurate as of 12/5/18 11:59 PM.  Always use your most recent med list.                   Brand Name Dispense Instructions for use Diagnosis    sildenafil 100 MG tablet    VIAGRA    40 tablet    Take 1 tablet 1 hour prior to sexual activity as instructed.    Erectile dysfunction, unspecified erectile  dysfunction type

## 2018-12-05 NOTE — LETTER
December 6, 2018      Garcia Isbell  [address] [facility] [address]        Dear ,    As you can see your labs did come back normal.  Given some thought with your current situation I suspect that may be it might be your homeopathic medications including flaxseed Hilario seed etc.  I would recommend stopping all the medications and see if the symptoms resolve.  If not I would recommend following up.    {results letter list:149987}    Resulted Orders   Lipase   Result Value Ref Range    Lipase 33 11 - 82 U/L   Comprehensive Metabolic Panel   Result Value Ref Range    Sodium 140 134 - 144 mmol/L    Potassium 4.0 3.5 - 5.1 mmol/L    Chloride 104 98 - 107 mmol/L    Carbon Dioxide 29 21 - 31 mmol/L    Anion Gap 7 3 - 14 mmol/L    Glucose 91 70 - 105 mg/dL    Urea Nitrogen 19 7 - 25 mg/dL    Creatinine 1.09 0.70 - 1.30 mg/dL    GFR Estimate Not Calculated >60 mL/min/1.7m2    GFR Estimate If Black Not Calculated >60 mL/min/1.7m2    Calcium 9.3 8.6 - 10.3 mg/dL    Bilirubin Total 0.7 0.3 - 1.0 mg/dL    Albumin 4.4 3.5 - 5.7 g/dL    Protein Total 6.8 6.4 - 8.9 g/dL    Alkaline Phosphatase 55 34 - 104 U/L    ALT 20 7 - 52 U/L    AST 17 13 - 39 U/L   CBC W PLT No Diff   Result Value Ref Range    WBC 9.6 4.0 - 11.0 10e9/L    RBC Count 4.55 4.4 - 5.9 10e12/L    Hemoglobin 15.1 13.3 - 17.7 g/dL    Hematocrit 43.4 40.0 - 53.0 %    MCV 95 78 - 100 fl    MCH 33.2 (H) 26.5 - 33.0 pg    MCHC 34.8 31.5 - 36.5 g/dL    RDW 12.2 10.0 - 15.0 %    Platelet Count 252 150 - 450 10e9/L       If you have any questions or concerns, please call the clinic at the number listed above.       Sincerely,        Ming Randall MD

## 2018-12-05 NOTE — LETTER
December 6, 2018      Garcia Isbell  [address]  [place] [address]        Dear ,    We are writing to inform you of your test results.    Your test results fall within the expected range(s) or remain unchanged from previous results.  Please continue with current treatment plan.    Resulted Orders   Lipase   Result Value Ref Range    Lipase 33 11 - 82 U/L   Comprehensive Metabolic Panel   Result Value Ref Range    Sodium 140 134 - 144 mmol/L    Potassium 4.0 3.5 - 5.1 mmol/L    Chloride 104 98 - 107 mmol/L    Carbon Dioxide 29 21 - 31 mmol/L    Anion Gap 7 3 - 14 mmol/L    Glucose 91 70 - 105 mg/dL    Urea Nitrogen 19 7 - 25 mg/dL    Creatinine 1.09 0.70 - 1.30 mg/dL    GFR Estimate Not Calculated >60 mL/min/1.7m2    GFR Estimate If Black Not Calculated >60 mL/min/1.7m2    Calcium 9.3 8.6 - 10.3 mg/dL    Bilirubin Total 0.7 0.3 - 1.0 mg/dL    Albumin 4.4 3.5 - 5.7 g/dL    Protein Total 6.8 6.4 - 8.9 g/dL    Alkaline Phosphatase 55 34 - 104 U/L    ALT 20 7 - 52 U/L    AST 17 13 - 39 U/L   CBC W PLT No Diff   Result Value Ref Range    WBC 9.6 4.0 - 11.0 10e9/L    RBC Count 4.55 4.4 - 5.9 10e12/L    Hemoglobin 15.1 13.3 - 17.7 g/dL    Hematocrit 43.4 40.0 - 53.0 %    MCV 95 78 - 100 fl    MCH 33.2 (H) 26.5 - 33.0 pg    MCHC 34.8 31.5 - 36.5 g/dL    RDW 12.2 10.0 - 15.0 %    Platelet Count 252 150 - 450 10e9/L       If you have any questions or concerns, please call the clinic at the number listed above.       Sincerely,        Ming Randall MD

## 2018-12-05 NOTE — NURSING NOTE
Patient here for GI bloating and soft stools. This has been happening for the past 5 years. Last colonoscopy possible 4-6 years. He is having odorous flatuance and noisy as well.  He had polyps removed with both procedures. Medication Reconciliation: complete.    Beth Berkowitz LPN  12/5/2018 1:35 PM

## 2018-12-06 NOTE — PROGRESS NOTES
SUBJECTIVE:   Garcia Isbell is a 68 year old male who presents to clinic today for the following health issues: Loose stools    HPI Comments: Patient arrives here for loose stools.  His P CP is Dr. Diaz who it sounds like is out on medical leave according the patientch.  He comes in with bloating loose stools.  They have been going on for years.  He reports no discomfort.  States his abdomen is constantly making noise.  He feels bloated.  No antibiotics over the last 2 years.  He has had 2 colonoscopies in the last 5 years.  Both with polyps.  He believes his last colonoscopy was 2 years ago.  His weight is remained stable.  He is on numerous home medications including flaxseed Hilario B root vitamin D vitamin E among others.  He lost his kidney in 2006.  Foods do not seem to make it worse that he can associate them with.        Patient Active Problem List    Diagnosis Date Noted     Abdominal pain, generalized 12/05/2018     Priority: Medium     Essential hypertension 07/13/2018     Priority: Medium     Erectile dysfunction, unspecified erectile dysfunction type 07/13/2018     Priority: Medium     Palpitations 07/13/2018     Priority: Medium     Shortness of breath 07/13/2018     Priority: Medium     Valvular regurgitation 07/13/2018     Priority: Medium     H/O unilateral nephrectomy 07/13/2018     Priority: Medium     No past medical history on file.   No past surgical history on file.  Social History     Social History Narrative    p 9/12/2013.     Current Outpatient Prescriptions   Medication Sig Dispense Refill     sildenafil (VIAGRA) 100 MG tablet Take 1 tablet 1 hour prior to sexual activity as instructed. 40 tablet 11     No Known Allergies    Review of Systems     OBJECTIVE:     /78 (BP Location: Right arm, Patient Position: Sitting)  Pulse 64  Wt 191 lb 6.4 oz (86.8 kg)  BMI 26.69 kg/m2  Body mass index is 26.69 kg/(m^2).  Physical Exam   Constitutional: He appears well-developed.   HENT:   Head:  Normocephalic.   Left Ear: External ear normal.   Cardiovascular: Normal rate, regular rhythm and normal heart sounds.    No murmur heard.  Pulmonary/Chest: Effort normal and breath sounds normal.   Abdominal: Soft. Bowel sounds are normal. He exhibits no distension and no mass. There is no tenderness. There is no guarding.   Musculoskeletal: Normal range of motion.   Neurological: He is alert.       Diagnostic Test Results:  Results for orders placed or performed in visit on 12/05/18   Lipase   Result Value Ref Range    Lipase 33 11 - 82 U/L   Comprehensive Metabolic Panel   Result Value Ref Range    Sodium 140 134 - 144 mmol/L    Potassium 4.0 3.5 - 5.1 mmol/L    Chloride 104 98 - 107 mmol/L    Carbon Dioxide 29 21 - 31 mmol/L    Anion Gap 7 3 - 14 mmol/L    Glucose 91 70 - 105 mg/dL    Urea Nitrogen 19 7 - 25 mg/dL    Creatinine 1.09 0.70 - 1.30 mg/dL    GFR Estimate Not Calculated >60 mL/min/1.7m2    GFR Estimate If Black Not Calculated >60 mL/min/1.7m2    Calcium 9.3 8.6 - 10.3 mg/dL    Bilirubin Total 0.7 0.3 - 1.0 mg/dL    Albumin 4.4 3.5 - 5.7 g/dL    Protein Total 6.8 6.4 - 8.9 g/dL    Alkaline Phosphatase 55 34 - 104 U/L    ALT 20 7 - 52 U/L    AST 17 13 - 39 U/L   CBC W PLT No Diff   Result Value Ref Range    WBC 9.6 4.0 - 11.0 10e9/L    RBC Count 4.55 4.4 - 5.9 10e12/L    Hemoglobin 15.1 13.3 - 17.7 g/dL    Hematocrit 43.4 40.0 - 53.0 %    MCV 95 78 - 100 fl    MCH 33.2 (H) 26.5 - 33.0 pg    MCHC 34.8 31.5 - 36.5 g/dL    RDW 12.2 10.0 - 15.0 %    Platelet Count 252 150 - 450 10e9/L       ASSESSMENT/PLAN:         1. Abdominal pain, generalized  With loose stools.  Etiology unclear.  Patient reports no environmental exposures no recent antibiotic use.  Possibly after reviewing his chart could be his homeopathic medication.  L letter will be dictated.  - Lipase; Future  - Comprehensive Metabolic Panel; Future  - CBC W PLT No Diff; Future  - Lipase  - Comprehensive Metabolic Panel  - CBC W PLT No  Diff      Ming Randall MD  [facility]

## 2018-12-19 ENCOUNTER — ALLIED HEALTH/NURSE VISIT (OUTPATIENT)
Dept: FAMILY MEDICINE | Facility: OTHER | Age: 68
End: 2018-12-19
Attending: FAMILY MEDICINE
Payer: COMMERCIAL

## 2018-12-19 DIAGNOSIS — Z23 NEED FOR VACCINATION: Primary | ICD-10-CM

## 2018-12-19 PROCEDURE — G0009 ADMIN PNEUMOCOCCAL VACCINE: HCPCS

## 2018-12-19 PROCEDURE — 90471 IMMUNIZATION ADMIN: CPT

## 2018-12-19 PROCEDURE — 90732 PPSV23 VACC 2 YRS+ SUBQ/IM: CPT

## 2018-12-19 NOTE — PROGRESS NOTES
Pt denies allergies to yeast gelatin neosporin eggs thimerasol or latex or past reactions to vaccinations. Verified name and date of birth  Copy of MIIC given.  Lillie Harp RN on 12/19/2018 at 10:09 AM

## 2020-12-30 ENCOUNTER — OFFICE VISIT (OUTPATIENT)
Dept: UROLOGY | Facility: OTHER | Age: 70
End: 2020-12-30
Attending: UROLOGY
Payer: COMMERCIAL

## 2020-12-30 VITALS
DIASTOLIC BLOOD PRESSURE: 87 MMHG | WEIGHT: 202 LBS | RESPIRATION RATE: 16 BRPM | BODY MASS INDEX: 28.17 KG/M2 | HEART RATE: 77 BPM | SYSTOLIC BLOOD PRESSURE: 132 MMHG

## 2020-12-30 DIAGNOSIS — N52.9 ED (ERECTILE DYSFUNCTION) OF ORGANIC ORIGIN: Primary | ICD-10-CM

## 2020-12-30 PROCEDURE — 99214 OFFICE O/P EST MOD 30 MIN: CPT | Performed by: UROLOGY

## 2020-12-30 PROCEDURE — G0463 HOSPITAL OUTPT CLINIC VISIT: HCPCS

## 2020-12-30 RX ORDER — HYDROCHLOROTHIAZIDE 12.5 MG/1
12.5 CAPSULE ORAL
COMMUNITY

## 2020-12-30 ASSESSMENT — PAIN SCALES - GENERAL: PAINLEVEL: NO PAIN (0)

## 2020-12-30 NOTE — PROGRESS NOTES
Type of Visit  EST    Chief Complaint  Erectile dysfunction    HPI  Mr. Isbell is a 69 year old male who follows up with erectile dysfunction.  His ED issues started over 12 years ago.  He has tried numerous medications in the past including Viagra, Cialis and Levitra.  Currently he takes generic sildenafil 100 mg per dose with minimal benefit.  He denies side effects.  He is frustrated however and is interested in alternative treatment options.  Maintaining an erection is the primary issue.  He does not take nitroglycerin products.  He is interested in intracavernosal injections.    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      Review of Systems  I personally reviewed the ROS with the patient.    Nursing Notes:   Keisha Willson, LPN  12/30/2020  2:30 PM  Signed  Pt presents to clinic for ED follow up    Review of Systems:    Weight loss:    No     Recent fever/chills:  No   Night sweats:   No  Current skin rash:  No   Recent hair loss:  No  Heat intolerance:  No   Cold intolerance:  No  Chest pain:   No   Palpitations:   No  Shortness of breath:  No   Wheezing:   No  Constipation:    No   Diarrhea:   No   Nausea:   No   Vomiting:   No   Kidney/side pain:  No   Back pain:   No  Frequent headaches:  No   Dizziness:     No  Leg swelling:   No   Calf pain:    No            Physical Exam  Vitals:    12/30/20 1417   BP: 132/87   BP Location: Left arm   Patient Position: Sitting   Cuff Size: Adult Regular   Pulse: 77   Resp: 16   Weight: 91.6 kg (202 lb)     Constitutional: No acute distress.  Alert and cooperative   Head: NCAT  Eyes: Conjunctivae normal  Cardiovascular: Regular rate  Pulmonary/Chest: Respirations are even and non-labored bilaterally, no audible wheezing  Abdominal: Soft. No distension, tenderness, masses or guarding.   Neurological: A + O x 3.  Cranial Nerves II-XII grossly intact.  Extremities: SHELLIE x 4, Warm. No clubbing.  No cyanosis.    Skin: Pink, warm and dry.  No visible rashes  noted.  Psychiatric:  Normal mood and affect  Back:  No left CVA tenderness.  No right CVA tenderness.  Genitourinary:  Nonpalpable bladder  Normal male phallus without discharge or lesions, normal pubic hair distribution.    Testicles descended bilaterally.     Labs  Results for TIFFANY WHITE (MRN [id number]) as of 7/26/2018 13:18   9/17/2012 11:20   PSA 1.49     Assessment  Mr. White is a 69 year old male with erectile dysfunction.  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    We again discussed treatment options including oral PDE5- medication, Trimix injections, vacuum erection devices and implantable penile prostheses.    We had a long conversation describing the approach to each treatment option above.  He is interested in intracavernosal injections.  He would like to follow-up for a trial    Plan  Discontinue sildenafil  Start intracavernosal injections with a trial of Trimix 0.2mL per dose        I spent 25 minutes on this patient's visit and over 50% of this time was spent in face-to-face counseling regarding his diagnosis, treatment options with emphasis on  risks and benefits of each, prognosis and importance of compliance.

## 2020-12-30 NOTE — NURSING NOTE
Pt presents to clinic for ED follow up    Review of Systems:    Weight loss:    No     Recent fever/chills:  No   Night sweats:   No  Current skin rash:  No   Recent hair loss:  No  Heat intolerance:  No   Cold intolerance:  No  Chest pain:   No   Palpitations:   No  Shortness of breath:  No   Wheezing:   No  Constipation:    No   Diarrhea:   No   Nausea:   No   Vomiting:   No   Kidney/side pain:  No   Back pain:   No  Frequent headaches:  No   Dizziness:     No  Leg swelling:   No   Calf pain:    No

## 2020-12-31 ENCOUNTER — TELEPHONE (OUTPATIENT)
Dept: UROLOGY | Facility: OTHER | Age: 70
End: 2020-12-31

## 2020-12-31 NOTE — TELEPHONE ENCOUNTER
[facility] called and they are wondering if the trimix should be shipped to pharmacy or the patient. They stated they usually do one or the other.    Hanna Jackson on 12/31/2020 at 9:15 AM

## 2021-01-04 NOTE — TELEPHONE ENCOUNTER
Spoke with Tori at [facility] and clarified that med should be sent here and the bill should be sent to patient, she transferred me to pharmacist Henrry.  [facility] will be calling patient

## 2021-01-15 ENCOUNTER — TELEPHONE (OUTPATIENT)
Dept: UROLOGY | Facility: OTHER | Age: 71
End: 2021-01-15

## 2021-01-15 NOTE — TELEPHONE ENCOUNTER
Patient called and states he got the trimix. He tried it. States he knows how to use it, has a book on it and talked to the pharmacy. He states it did not hardly do anything for him. He is leaving for [place] on 01/16/2021. Since he will be out of state, a phone visit is not appropriate. Please advise.    Hanna Jackson on 1/15/2021 at 10:15 AM

## 2021-01-18 NOTE — TELEPHONE ENCOUNTER
Pt states that he received the Trimix and left for [place].  .  Did receive instructions from pharmacist and has a book on how to inject, Did inject 0.2ml and states that he did not have very good results  Wondering what else he could do or use?

## 2021-01-20 NOTE — TELEPHONE ENCOUNTER
Patient is scheduled for a phone visit on 01/21/2021 @ 8:00.  Schedule per Dr. Hightower.    Hanna Jackson on 1/20/2021 at 8:24 AM

## 2021-01-21 ENCOUNTER — VIRTUAL VISIT (OUTPATIENT)
Dept: UROLOGY | Facility: OTHER | Age: 71
End: 2021-01-21
Attending: UROLOGY
Payer: COMMERCIAL

## 2021-01-21 VITALS — BODY MASS INDEX: 27.2 KG/M2 | WEIGHT: 195 LBS

## 2021-01-21 DIAGNOSIS — N52.9 ED (ERECTILE DYSFUNCTION) OF ORGANIC ORIGIN: Primary | ICD-10-CM

## 2021-01-21 PROCEDURE — 99213 OFFICE O/P EST LOW 20 MIN: CPT | Mod: 95 | Performed by: UROLOGY

## 2021-01-21 ASSESSMENT — PAIN SCALES - GENERAL: PAINLEVEL: NO PAIN (0)

## 2021-01-21 NOTE — PROGRESS NOTES
"Garcia White is a 70 year old male who is being evaluated via a billable telephone visit.      The patient has been notified of following:     \"This telephone visit will be conducted via a call between you and your physician/provider. We have found that certain health care needs can be provided without the need for a physical exam.  This service lets us provide the care you need with a short phone conversation.  If a prescription is necessary we can send it directly to your pharmacy.  If lab work is needed we can place an order for that and you can then stop by our lab to have the test done at a later time.    If during the course of the call the physician/provider feels a telephone visit is not appropriate, you will not be charged for this service.\"     Patient has given verbal consent for Telephone visit?  Yes    Garcia White complains of    Chief Complaint   Patient presents with     Follow Up     Trimix     I have reviewed and updated the patient's Past Medical History, Social History, Family History and Medication List.    ALLERGIES  Patient has no known allergies.    Notes and Assessment  Patient complains of erectile dysfunction.  He recently picked up her prescription for intracavernosal injections.  This was his first prescription.  He missed the appointment for teaching so we did her discussing over the phone.    The patient has injected twice and achieved a 4/10 rigid erection both times.  He injected 0.2mL each time.    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    Labs  Results for GARCIA WHITE (MRN [id number]) as of 7/26/2018 13:18   9/17/2012 11:20   PSA 1.49     Assessment  Mr. White is a 69 year old male with erectile dysfunction.  We discussed the following regarding intracavernosal treatment:   -Pathophysiology of ED and its treatment options   -Instructions of how to inject Trimix   -Risks of the medication such as prolonged erections and scarring of the corpora   -Strategy for " titrating the dose   -Storage of the medication in the freezer or refrigerator    Plan  Continue intracavernosal injections with a trial of Trimix 0.25mL per dose            Gene Hightower MD      Phone call duration: 21  minutes

## 2021-01-26 ENCOUNTER — TELEPHONE (OUTPATIENT)
Dept: UROLOGY | Facility: OTHER | Age: 71
End: 2021-01-26

## 2021-01-26 NOTE — TELEPHONE ENCOUNTER
Pt states that he contacted [employer] to see if Trimix would be covered.  Writer explained that ED meds are usually not covered, patient will let us know if he wants to proceed

## 2021-07-29 ENCOUNTER — TELEPHONE (OUTPATIENT)
Dept: UROLOGY | Facility: OTHER | Age: 71
End: 2021-07-29

## 2021-07-29 NOTE — TELEPHONE ENCOUNTER
Patient is due for a follow up appointment for trimix [place]. He states he will call back to schedule when he has more time.    Hanna Jackson on 7/29/2021 at 2:14 PM

## 2021-08-09 ENCOUNTER — OFFICE VISIT (OUTPATIENT)
Dept: UROLOGY | Facility: OTHER | Age: 71
End: 2021-08-09
Attending: UROLOGY
Payer: COMMERCIAL

## 2021-08-09 VITALS
BODY MASS INDEX: 28.68 KG/M2 | DIASTOLIC BLOOD PRESSURE: 80 MMHG | RESPIRATION RATE: 16 BRPM | SYSTOLIC BLOOD PRESSURE: 130 MMHG | WEIGHT: 205.6 LBS | OXYGEN SATURATION: 98 % | HEART RATE: 67 BPM

## 2021-08-09 DIAGNOSIS — N52.9 ED (ERECTILE DYSFUNCTION) OF ORGANIC ORIGIN: Primary | ICD-10-CM

## 2021-08-09 PROCEDURE — G0463 HOSPITAL OUTPT CLINIC VISIT: HCPCS

## 2021-08-09 PROCEDURE — 99213 OFFICE O/P EST LOW 20 MIN: CPT | Performed by: UROLOGY

## 2021-08-09 ASSESSMENT — PAIN SCALES - GENERAL: PAINLEVEL: NO PAIN (0)

## 2021-08-09 NOTE — NURSING NOTE
Chief Complaint   Patient presents with     Follow Up     trimix    Patient presents to the clinic today for a trimix follow up     Review of Systems:    Weight loss:    No     Recent fever/chills:  No   Night sweats:   No  Current skin rash:  No   Recent hair loss:  No  Heat intolerance:  No   Cold intolerance:  No  Chest pain:   No   Palpitations:   No  Shortness of breath:  No   Wheezing:   No  Constipation:    No   Diarrhea:   No   Nausea:   No   Vomiting:   No   Kidney/side pain:  No   Back pain:   No  Frequent headaches:  No   Dizziness:     No  Leg swelling:   No   Calf pain:    No          Medication Reconciliation: completed   Diane Walls LPN  8/9/2021 10:40 AM

## 2021-08-09 NOTE — PROGRESS NOTES
Type of Visit  Established    Chief Complaint  ED    HPI  Mr. White is a 70 year old male follows up with erectile dysfunction.  Patient previously used generic sildenafil.  6 months ago he started using intracavernosal injections.  Trial went well however use at home was not as effective.  He has dose escalated up to 0.6 mL of standard concentration Trimix.    He is asking for additional treatment options.    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      Review of Systems  I reviewed the ROS with the patient.    Nursing Notes:   Diane Walls LPN  8/9/2021 10:42 AM  Signed  Chief Complaint   Patient presents with     Follow Up     trimix    Patient presents to the clinic today for a trimix follow up     Review of Systems:    Weight loss:    No     Recent fever/chills:  No   Night sweats:   No  Current skin rash:  No   Recent hair loss:  No  Heat intolerance:  No   Cold intolerance:  No  Chest pain:   No   Palpitations:   No  Shortness of breath:  No   Wheezing:   No  Constipation:    No   Diarrhea:   No   Nausea:   No   Vomiting:   No   Kidney/side pain:  No   Back pain:   No  Frequent headaches:  No   Dizziness:     No  Leg swelling:   No   Calf pain:    No          Medication Reconciliation: completed   Diane Walls LPN  8/9/2021 10:40 AM       Physical Exam  Vitals:    08/09/21 1045   BP: 130/80   BP Location: Right arm   Patient Position: Sitting   Cuff Size: Adult Large   Pulse: 67   Resp: 16   SpO2: 98%   Weight: 93.3 kg (205 lb 9.6 oz)     Constitutional: NAD, WDWN.  Cardiovascular: Regular rate.  Pulmonary/Chest: Respirations are even and non-labored bilaterally.  Abdominal: Soft. No distension, tenderness, masses or guarding. No CVA tenderness.  Extremities: SHELLIE x 4, Warm. No clubbing.  No cyanosis.    Skin: Pink, warm and dry.  No rashes noted.  Genitourinary: nonpalpable bladder    Labs  Results for TIFFANY WHITE (MRN [id number]) as of 7/26/2018 13:18   9/17/2012 11:20   PSA 1.49      Assessment  Mr. Isbell is a 70 year old male with erectile dysfunction.    We discussed oral sildenafil with constriction band versus super Trimix.  Patient would like to transition to super Trimix    Plan  Super Trimix 0.2mL per dose

## 2021-08-18 ENCOUNTER — TELEPHONE (OUTPATIENT)
Dept: UROLOGY | Facility: OTHER | Age: 71
End: 2021-08-18

## 2021-08-18 NOTE — TELEPHONE ENCOUNTER
Pt notified via message.  Writer spoke with Jenna, increase to SuperTrimix confirmed  Keisha Willson LPN.......8/18/2021 9:47 AM

## 2021-08-18 NOTE — TELEPHONE ENCOUNTER
Garcia called stating that [facility] [phone] has been out of Trimix and finally have the med back in stock.  Pharmacy needs  Approval for the medication increase for the patient and they will not refill the script without the doctor calling first.  Please call the pharmacy per patient request.     Nicolle Wilson on 8/18/2021 at 9:30 AM

## 2021-09-20 ENCOUNTER — TELEPHONE (OUTPATIENT)
Dept: UROLOGY | Facility: OTHER | Age: 71
End: 2021-09-20

## 2021-09-20 NOTE — TELEPHONE ENCOUNTER
Patient states that he has tripled the dose of the SuperTrimix. Would like to know how much he should increase

## 2021-09-20 NOTE — TELEPHONE ENCOUNTER
Per verbal conversation with Dr Hightower, Patient should slowly increase till he finds an amount that he is comfortable with.  He should use constriction band. It would not be beneficial for patient to go back to oral medications.

## 2021-09-20 NOTE — TELEPHONE ENCOUNTER
Patient called and has questions regarding his trimix and other medication for ED. Please contact patient.    Hanna Jackson on 9/20/2021 at 10:06 AM